# Patient Record
Sex: MALE | Race: WHITE
[De-identification: names, ages, dates, MRNs, and addresses within clinical notes are randomized per-mention and may not be internally consistent; named-entity substitution may affect disease eponyms.]

---

## 2019-11-17 ENCOUNTER — HOSPITAL ENCOUNTER (EMERGENCY)
Dept: HOSPITAL 95 - ER | Age: 49
Discharge: HOME | End: 2019-11-17
Payer: COMMERCIAL

## 2019-11-17 VITALS — WEIGHT: 194.01 LBS | BODY MASS INDEX: 27.16 KG/M2 | HEIGHT: 71 IN

## 2019-11-17 DIAGNOSIS — F17.210: ICD-10-CM

## 2019-11-17 DIAGNOSIS — K59.00: Primary | ICD-10-CM

## 2019-11-17 LAB
ALBUMIN SERPL BCP-MCNC: 3.5 G/DL (ref 3.4–5)
ALBUMIN/GLOB SERPL: 1.1 {RATIO} (ref 0.8–1.8)
ALT SERPL W P-5'-P-CCNC: 31 U/L (ref 12–78)
ANION GAP SERPL CALCULATED.4IONS-SCNC: 5 MMOL/L (ref 6–16)
AST SERPL W P-5'-P-CCNC: 30 U/L (ref 12–37)
BASOPHILS # BLD AUTO: 0.03 K/MM3 (ref 0–0.23)
BASOPHILS NFR BLD AUTO: 0 % (ref 0–2)
BILIRUB SERPL-MCNC: 0.4 MG/DL (ref 0.1–1)
BUN SERPL-MCNC: 12 MG/DL (ref 8–24)
CALCIUM SERPL-MCNC: 8.7 MG/DL (ref 8.5–10.1)
CHLORIDE SERPL-SCNC: 108 MMOL/L (ref 98–108)
CO2 SERPL-SCNC: 27 MMOL/L (ref 21–32)
CREAT SERPL-MCNC: 1.06 MG/DL (ref 0.6–1.2)
DEPRECATED RDW RBC AUTO: 45.2 FL (ref 35.1–46.3)
EOSINOPHIL # BLD AUTO: 0.25 K/MM3 (ref 0–0.68)
EOSINOPHIL NFR BLD AUTO: 3 % (ref 0–6)
ERYTHROCYTE [DISTWIDTH] IN BLOOD BY AUTOMATED COUNT: 12.5 % (ref 11.7–14.2)
GLOBULIN SER CALC-MCNC: 3.1 G/DL (ref 2.2–4)
GLUCOSE SERPL-MCNC: 126 MG/DL (ref 70–99)
HCT VFR BLD AUTO: 39.6 % (ref 37–53)
HGB BLD-MCNC: 13.3 G/DL (ref 13.5–17.5)
IMM GRANULOCYTES # BLD AUTO: 0.01 K/MM3 (ref 0–0.1)
IMM GRANULOCYTES NFR BLD AUTO: 0 % (ref 0–1)
KETONES UR STRIP-MCNC: (no result) MG/DL
LEUKOCYTE ESTERASE UR QL STRIP: (no result)
LYMPHOCYTES # BLD AUTO: 2.85 K/MM3 (ref 0.84–5.2)
LYMPHOCYTES NFR BLD AUTO: 37 % (ref 21–46)
MCHC RBC AUTO-ENTMCNC: 33.6 G/DL (ref 31.5–36.5)
MCV RBC AUTO: 98 FL (ref 80–100)
MONOCYTES # BLD AUTO: 0.51 K/MM3 (ref 0.16–1.47)
MONOCYTES NFR BLD AUTO: 7 % (ref 4–13)
NEUTROPHILS # BLD AUTO: 4.04 K/MM3 (ref 1.96–9.15)
NEUTROPHILS NFR BLD AUTO: 53 % (ref 41–73)
NRBC # BLD AUTO: 0 K/MM3 (ref 0–0.02)
NRBC BLD AUTO-RTO: 0 /100 WBC (ref 0–0.2)
PLATELET # BLD AUTO: 211 K/MM3 (ref 150–400)
POTASSIUM SERPL-SCNC: 3.9 MMOL/L (ref 3.5–5.5)
PROT SERPL-MCNC: 6.6 G/DL (ref 6.4–8.2)
RBC #/AREA URNS HPF: (no result) /HPF (ref 0–2)
SODIUM SERPL-SCNC: 140 MMOL/L (ref 136–145)
SP GR SPEC: 1.02 (ref 1–1.02)
UROBILINOGEN UR STRIP-MCNC: (no result) MG/DL
WBC #/AREA URNS HPF: (no result) /HPF (ref 0–5)

## 2020-07-14 ENCOUNTER — HOSPITAL ENCOUNTER (OUTPATIENT)
Dept: HOSPITAL 95 - ER | Age: 50
Setting detail: OBSERVATION
LOS: 2 days | Discharge: HOME | End: 2020-07-16
Attending: HOSPITALIST | Admitting: INTERNAL MEDICINE
Payer: COMMERCIAL

## 2020-07-14 VITALS — WEIGHT: 214.99 LBS | HEIGHT: 72.05 IN | BODY MASS INDEX: 29.12 KG/M2

## 2020-07-14 DIAGNOSIS — F17.210: ICD-10-CM

## 2020-07-14 DIAGNOSIS — Z71.6: ICD-10-CM

## 2020-07-14 DIAGNOSIS — Z66: ICD-10-CM

## 2020-07-14 DIAGNOSIS — J44.9: ICD-10-CM

## 2020-07-14 DIAGNOSIS — I10: ICD-10-CM

## 2020-07-14 DIAGNOSIS — R07.89: Primary | ICD-10-CM

## 2020-07-14 DIAGNOSIS — Z91.14: ICD-10-CM

## 2020-07-14 DIAGNOSIS — F15.10: ICD-10-CM

## 2020-07-14 DIAGNOSIS — F31.9: ICD-10-CM

## 2020-07-14 DIAGNOSIS — Z79.899: ICD-10-CM

## 2020-07-14 LAB
ALBUMIN SERPL BCP-MCNC: 3.8 G/DL (ref 3.4–5)
ALBUMIN/GLOB SERPL: 1.1 {RATIO} (ref 0.8–1.8)
ALT SERPL W P-5'-P-CCNC: 25 U/L (ref 12–78)
ANION GAP SERPL CALCULATED.4IONS-SCNC: 2 MMOL/L (ref 6–16)
AST SERPL W P-5'-P-CCNC: 20 U/L (ref 12–37)
BASOPHILS # BLD AUTO: 0.04 K/MM3 (ref 0–0.23)
BASOPHILS NFR BLD AUTO: 1 % (ref 0–2)
BILIRUB SERPL-MCNC: 0.5 MG/DL (ref 0.1–1)
BUN SERPL-MCNC: 16 MG/DL (ref 8–24)
CALCIUM SERPL-MCNC: 9.1 MG/DL (ref 8.5–10.1)
CHLORIDE SERPL-SCNC: 108 MMOL/L (ref 98–108)
CO2 SERPL-SCNC: 29 MMOL/L (ref 21–32)
CREAT SERPL-MCNC: 1.04 MG/DL (ref 0.6–1.2)
DEPRECATED RDW RBC AUTO: 46.5 FL (ref 35.1–46.3)
EOSINOPHIL # BLD AUTO: 0.45 K/MM3 (ref 0–0.68)
EOSINOPHIL NFR BLD AUTO: 6 % (ref 0–6)
ERYTHROCYTE [DISTWIDTH] IN BLOOD BY AUTOMATED COUNT: 13 % (ref 11.7–14.2)
GLOBULIN SER CALC-MCNC: 3.4 G/DL (ref 2.2–4)
GLUCOSE SERPL-MCNC: 110 MG/DL (ref 70–99)
HCT VFR BLD AUTO: 43.5 % (ref 37–53)
HGB BLD-MCNC: 14.6 G/DL (ref 13.5–17.5)
IMM GRANULOCYTES # BLD AUTO: 0.01 K/MM3 (ref 0–0.1)
IMM GRANULOCYTES NFR BLD AUTO: 0 % (ref 0–1)
LYMPHOCYTES # BLD AUTO: 3.03 K/MM3 (ref 0.84–5.2)
LYMPHOCYTES NFR BLD AUTO: 42 % (ref 21–46)
MCHC RBC AUTO-ENTMCNC: 33.6 G/DL (ref 31.5–36.5)
MCV RBC AUTO: 97 FL (ref 80–100)
MONOCYTES # BLD AUTO: 0.54 K/MM3 (ref 0.16–1.47)
MONOCYTES NFR BLD AUTO: 8 % (ref 4–13)
NEUTROPHILS # BLD AUTO: 3.16 K/MM3 (ref 1.96–9.15)
NEUTROPHILS NFR BLD AUTO: 44 % (ref 41–73)
NRBC # BLD AUTO: 0 K/MM3 (ref 0–0.02)
NRBC BLD AUTO-RTO: 0 /100 WBC (ref 0–0.2)
PLATELET # BLD AUTO: 229 K/MM3 (ref 150–400)
POTASSIUM SERPL-SCNC: 3.7 MMOL/L (ref 3.5–5.5)
PROT SERPL-MCNC: 7.2 G/DL (ref 6.4–8.2)
SODIUM SERPL-SCNC: 139 MMOL/L (ref 136–145)
TROPONIN I SERPL-MCNC: <0.015 NG/ML (ref 0–0.04)

## 2020-07-14 PROCEDURE — A9500 TC99M SESTAMIBI: HCPCS

## 2020-07-14 PROCEDURE — G0378 HOSPITAL OBSERVATION PER HR: HCPCS

## 2020-07-14 PROCEDURE — A9270 NON-COVERED ITEM OR SERVICE: HCPCS

## 2020-07-14 NOTE — NUR
UNABLE TO FIND OUT PATIENTS MEDICINES FROM [facility]. PER MD NOTE PATIENT ON HTN
MED, BUT DOES NOT KNOW IT.

## 2020-07-14 NOTE — NUR
TALKED TO MARY JANE ABOUT DIET. OK TO ORDER DINNER TRAY, BUT NPO AFTER MIDNITE.
ATTEMPT TO GET REPORT FROM DAREN MORENO

## 2020-07-15 LAB — CANNABINOIDS UR QL: DETECTED

## 2020-07-15 NOTE — NUR
ALERT. ORIENTED. COOPERATIVE. UNLABORED RESPIRATIONS. HAD FIRST PART OF STRESS
TEST. SCHEDULED FOR SECOND PART TOMORROW AT 12:30. NO ACUTE CHANGES. TELE ON
AND HAS BEEN SR. WCTM

## 2020-07-15 NOTE — NUR
SHIFT SUMMARY
 
RESTING QUIETLY AT INTERVALS THIS SHIFT. IVF INFUSED AND IV SITE SALINE
LOCKED. TOLERATED HS SNACK WELL AND HAS BEEN NPO SINCE 0000 FOR AM PROEDURE.
ICOLATION PRECAUTIONS MAINTAINED. CALL [facility] [place]

## 2020-07-15 NOTE — NUR
ADVISED; PATIENT LEFT BUILDING FOR ABOUT ONE HOUR AND HAD SMOKE AND
WHEN LEFT HAD C.PRESSURE THAT DID NOT RADIATE, NO DIAPHORESIS OR N/V. 6(1-10).
ORDER UDS AND NTG. PATIENT DID ADMIT TO [facility] 2 WEEKS AGO. PATIENT WAS ADVISED
CAN NOT LEAVE BUILDING, NEEDS MASK WHEN WALKING IN HALLS,& CAN NOT LEAVE THIS
FLOOR. ADVISED IF GONE LONGER THAN 1 HOUR WILL BE CONSIDERED HE LEFT AMA.
PATIENT VERBALIZES UNDERSTANDING.

## 2020-07-15 NOTE — NUR
PATIENT AWARE CAN EAT UP TILL 10AM, THEN H2O TILL 1430. NO CAFFEINE AFTER 6PM.
ORDERS PLACED ON BOARD AS SUCH. PATIENT VERBALIZES UNERSTANDING.

## 2020-07-16 NOTE — NUR
DISCHARGE NOTE-
 CAME TO SPEAK TO PT ABOUT HIS TEST RESULTS AND PT WAS DISCHARGED HOME.IV
AND TELE DC'D PRIOR TO PT DISCHARGE. PT WAS PROVIDED WITH VERBAL AND WRITTEN
DISCHARGE INSTRUCTIONS AND ACKNOWLEDGED UNDERSTANDING OF THEM. NO FURTHER
QUESTIONS AT THE TIME OF DISCHARGE. PT ESCORTED OUT BY THE CNA REFUSED WC
ESCORT.

## 2020-09-10 ENCOUNTER — HOSPITAL ENCOUNTER (OUTPATIENT)
Dept: HOSPITAL 95 - LAB EV | Age: 50
Discharge: HOME | End: 2020-09-10
Attending: GENERAL PRACTICE
Payer: COMMERCIAL

## 2020-09-10 ENCOUNTER — HOSPITAL ENCOUNTER (EMERGENCY)
Dept: HOSPITAL 95 - ER | Age: 50
Discharge: HOME | End: 2020-09-10
Payer: COMMERCIAL

## 2020-09-10 VITALS — BODY MASS INDEX: 30.8 KG/M2 | HEIGHT: 71 IN | WEIGHT: 220 LBS

## 2020-09-10 DIAGNOSIS — F31.9: ICD-10-CM

## 2020-09-10 DIAGNOSIS — E86.0: Primary | ICD-10-CM

## 2020-09-10 DIAGNOSIS — R19.7: ICD-10-CM

## 2020-09-10 DIAGNOSIS — R10.9: Primary | ICD-10-CM

## 2020-09-10 DIAGNOSIS — Z79.899: ICD-10-CM

## 2020-09-10 DIAGNOSIS — R19.5: ICD-10-CM

## 2020-09-10 DIAGNOSIS — I10: ICD-10-CM

## 2020-09-10 DIAGNOSIS — F17.210: ICD-10-CM

## 2020-09-10 DIAGNOSIS — R50.9: ICD-10-CM

## 2020-09-10 DIAGNOSIS — R10.31: ICD-10-CM

## 2020-09-10 LAB
ALBUMIN SERPL BCP-MCNC: 3.6 G/DL (ref 3.4–5)
ALBUMIN/GLOB SERPL: 1 {RATIO} (ref 0.8–1.8)
ALT SERPL W P-5'-P-CCNC: 23 U/L (ref 12–78)
AMYLASE SERPL-CCNC: 72 U/L (ref 25–115)
ANION GAP SERPL CALCULATED.4IONS-SCNC: 8 MMOL/L (ref 6–16)
AST SERPL W P-5'-P-CCNC: 18 U/L (ref 12–37)
BASOPHILS # BLD AUTO: 0.04 K/MM3 (ref 0–0.23)
BASOPHILS NFR BLD AUTO: 1 % (ref 0–2)
BILIRUB SERPL-MCNC: 0.2 MG/DL (ref 0.1–1)
BUN SERPL-MCNC: 21 MG/DL (ref 8–24)
CALCIUM SERPL-MCNC: 8.7 MG/DL (ref 8.5–10.1)
CHLORIDE SERPL-SCNC: 104 MMOL/L (ref 98–108)
CO2 SERPL-SCNC: 27 MMOL/L (ref 21–32)
CREAT SERPL-MCNC: 1.21 MG/DL (ref 0.6–1.2)
DEPRECATED RDW RBC AUTO: 45.1 FL (ref 35.1–46.3)
EOSINOPHIL # BLD AUTO: 0.51 K/MM3 (ref 0–0.68)
EOSINOPHIL NFR BLD AUTO: 6 % (ref 0–6)
ERYTHROCYTE [DISTWIDTH] IN BLOOD BY AUTOMATED COUNT: 12.7 % (ref 11.7–14.2)
GLOBULIN SER CALC-MCNC: 3.5 G/DL (ref 2.2–4)
GLUCOSE SERPL-MCNC: 96 MG/DL (ref 70–99)
HCT VFR BLD AUTO: 43.1 % (ref 37–53)
HGB BLD-MCNC: 14.7 G/DL (ref 13.5–17.5)
IMM GRANULOCYTES # BLD AUTO: 0.02 K/MM3 (ref 0–0.1)
IMM GRANULOCYTES NFR BLD AUTO: 0 % (ref 0–1)
LYMPHOCYTES # BLD AUTO: 2.87 K/MM3 (ref 0.84–5.2)
LYMPHOCYTES NFR BLD AUTO: 35 % (ref 21–46)
MCHC RBC AUTO-ENTMCNC: 34.1 G/DL (ref 31.5–36.5)
MCV RBC AUTO: 96 FL (ref 80–100)
MONOCYTES # BLD AUTO: 0.57 K/MM3 (ref 0.16–1.47)
MONOCYTES NFR BLD AUTO: 7 % (ref 4–13)
NEUTROPHILS # BLD AUTO: 4.1 K/MM3 (ref 1.96–9.15)
NEUTROPHILS NFR BLD AUTO: 51 % (ref 41–73)
NRBC # BLD AUTO: 0 K/MM3 (ref 0–0.02)
NRBC BLD AUTO-RTO: 0 /100 WBC (ref 0–0.2)
PLATELET # BLD AUTO: 236 K/MM3 (ref 150–400)
POTASSIUM SERPL-SCNC: 4.2 MMOL/L (ref 3.5–5.5)
PROT SERPL-MCNC: 7.1 G/DL (ref 6.4–8.2)
PROTHROMBIN TIME: 9.8 SEC (ref 9.7–11.5)
SODIUM SERPL-SCNC: 139 MMOL/L (ref 136–145)

## 2020-09-16 ENCOUNTER — HOSPITAL ENCOUNTER (EMERGENCY)
Dept: HOSPITAL 95 - ER | Age: 50
Discharge: HOME | End: 2020-09-16
Payer: COMMERCIAL

## 2020-09-16 VITALS — HEIGHT: 71 IN | WEIGHT: 214.99 LBS | BODY MASS INDEX: 30.1 KG/M2

## 2020-09-16 DIAGNOSIS — R10.9: Primary | ICD-10-CM

## 2020-09-16 DIAGNOSIS — F17.210: ICD-10-CM

## 2020-09-16 DIAGNOSIS — R07.9: ICD-10-CM

## 2020-09-16 DIAGNOSIS — Z79.899: ICD-10-CM

## 2020-09-16 DIAGNOSIS — R35.0: ICD-10-CM

## 2020-09-16 DIAGNOSIS — F31.9: ICD-10-CM

## 2020-09-16 DIAGNOSIS — I10: ICD-10-CM

## 2020-09-16 LAB
ALBUMIN SERPL BCP-MCNC: 3.5 G/DL (ref 3.4–5)
ALBUMIN/GLOB SERPL: 1.1 {RATIO} (ref 0.8–1.8)
ALT SERPL W P-5'-P-CCNC: 24 U/L (ref 12–78)
ANION GAP SERPL CALCULATED.4IONS-SCNC: 3 MMOL/L (ref 6–16)
AST SERPL W P-5'-P-CCNC: 19 U/L (ref 12–37)
BASOPHILS # BLD AUTO: 0.07 K/MM3 (ref 0–0.23)
BASOPHILS NFR BLD AUTO: 1 % (ref 0–2)
BILIRUB SERPL-MCNC: 0.3 MG/DL (ref 0.1–1)
BUN SERPL-MCNC: 17 MG/DL (ref 8–24)
CALCIUM SERPL-MCNC: 8.6 MG/DL (ref 8.5–10.1)
CHLORIDE SERPL-SCNC: 110 MMOL/L (ref 98–108)
CO2 SERPL-SCNC: 28 MMOL/L (ref 21–32)
CREAT SERPL-MCNC: 1.09 MG/DL (ref 0.6–1.2)
DEPRECATED RDW RBC AUTO: 43.2 FL (ref 35.1–46.3)
EOSINOPHIL # BLD AUTO: 0.46 K/MM3 (ref 0–0.68)
EOSINOPHIL NFR BLD AUTO: 4 % (ref 0–6)
ERYTHROCYTE [DISTWIDTH] IN BLOOD BY AUTOMATED COUNT: 12.4 % (ref 11.7–14.2)
GLOBULIN SER CALC-MCNC: 3.2 G/DL (ref 2.2–4)
GLUCOSE SERPL-MCNC: 114 MG/DL (ref 70–99)
HCT VFR BLD AUTO: 42.3 % (ref 37–53)
HGB BLD-MCNC: 14.7 G/DL (ref 13.5–17.5)
IMM GRANULOCYTES # BLD AUTO: 0.03 K/MM3 (ref 0–0.1)
IMM GRANULOCYTES NFR BLD AUTO: 0 % (ref 0–1)
LYMPHOCYTES # BLD AUTO: 2.79 K/MM3 (ref 0.84–5.2)
LYMPHOCYTES NFR BLD AUTO: 23 % (ref 21–46)
MCHC RBC AUTO-ENTMCNC: 34.8 G/DL (ref 31.5–36.5)
MCV RBC AUTO: 95 FL (ref 80–100)
MONOCYTES # BLD AUTO: 0.83 K/MM3 (ref 0.16–1.47)
MONOCYTES NFR BLD AUTO: 7 % (ref 4–13)
NEUTROPHILS # BLD AUTO: 7.94 K/MM3 (ref 1.96–9.15)
NEUTROPHILS NFR BLD AUTO: 66 % (ref 41–73)
NRBC # BLD AUTO: 0 K/MM3 (ref 0–0.02)
NRBC BLD AUTO-RTO: 0 /100 WBC (ref 0–0.2)
PLATELET # BLD AUTO: 249 K/MM3 (ref 150–400)
POTASSIUM SERPL-SCNC: 4.2 MMOL/L (ref 3.5–5.5)
PROT SERPL-MCNC: 6.7 G/DL (ref 6.4–8.2)
SODIUM SERPL-SCNC: 141 MMOL/L (ref 136–145)
SP GR SPEC: 1.02 (ref 1–1.02)
UROBILINOGEN UR STRIP-MCNC: (no result) MG/DL

## 2020-09-22 ENCOUNTER — HOSPITAL ENCOUNTER (OUTPATIENT)
Dept: HOSPITAL 95 - LAB EV | Age: 50
End: 2020-09-22
Attending: PHYSICIAN ASSISTANT
Payer: COMMERCIAL

## 2020-09-22 DIAGNOSIS — R10.31: Primary | ICD-10-CM

## 2020-09-22 LAB
BASOPHILS # BLD AUTO: 0.05 K/MM3 (ref 0–0.23)
BASOPHILS NFR BLD AUTO: 1 % (ref 0–2)
DEPRECATED RDW RBC AUTO: 44.2 FL (ref 35.1–46.3)
EOSINOPHIL # BLD AUTO: 0.42 K/MM3 (ref 0–0.68)
EOSINOPHIL NFR BLD AUTO: 7 % (ref 0–6)
ERYTHROCYTE [DISTWIDTH] IN BLOOD BY AUTOMATED COUNT: 12.8 % (ref 11.7–14.2)
HCT VFR BLD AUTO: 37.9 % (ref 37–53)
HGB BLD-MCNC: 13 G/DL (ref 13.5–17.5)
IMM GRANULOCYTES # BLD AUTO: 0.05 K/MM3 (ref 0–0.1)
IMM GRANULOCYTES NFR BLD AUTO: 1 % (ref 0–1)
LYMPHOCYTES # BLD AUTO: 2.39 K/MM3 (ref 0.84–5.2)
LYMPHOCYTES NFR BLD AUTO: 38 % (ref 21–46)
MCHC RBC AUTO-ENTMCNC: 34.3 G/DL (ref 31.5–36.5)
MCV RBC AUTO: 95 FL (ref 80–100)
MONOCYTES # BLD AUTO: 0.61 K/MM3 (ref 0.16–1.47)
MONOCYTES NFR BLD AUTO: 10 % (ref 4–13)
NEUTROPHILS # BLD AUTO: 2.72 K/MM3 (ref 1.96–9.15)
NEUTROPHILS NFR BLD AUTO: 44 % (ref 41–73)
NRBC # BLD AUTO: 0 K/MM3 (ref 0–0.02)
NRBC BLD AUTO-RTO: 0 /100 WBC (ref 0–0.2)
PLATELET # BLD AUTO: 226 K/MM3 (ref 150–400)

## 2020-10-14 ENCOUNTER — HOSPITAL ENCOUNTER (EMERGENCY)
Dept: HOSPITAL 95 - ER | Age: 50
Discharge: HOME | End: 2020-10-14
Payer: COMMERCIAL

## 2020-10-14 VITALS — BODY MASS INDEX: 30.1 KG/M2 | HEIGHT: 71 IN | WEIGHT: 214.99 LBS

## 2020-10-14 DIAGNOSIS — K21.9: Primary | ICD-10-CM

## 2020-10-14 DIAGNOSIS — R06.02: ICD-10-CM

## 2020-10-14 DIAGNOSIS — F31.9: ICD-10-CM

## 2020-10-14 DIAGNOSIS — R07.9: ICD-10-CM

## 2020-10-14 DIAGNOSIS — I10: ICD-10-CM

## 2020-10-14 DIAGNOSIS — F17.210: ICD-10-CM

## 2020-10-14 DIAGNOSIS — Z79.899: ICD-10-CM

## 2020-10-14 LAB
ALBUMIN SERPL BCP-MCNC: 3.9 G/DL (ref 3.4–5)
ALBUMIN/GLOB SERPL: 1.1 {RATIO} (ref 0.8–1.8)
ALT SERPL W P-5'-P-CCNC: 29 U/L (ref 12–78)
ANION GAP SERPL CALCULATED.4IONS-SCNC: 6 MMOL/L (ref 6–16)
AST SERPL W P-5'-P-CCNC: 29 U/L (ref 12–37)
BASOPHILS # BLD AUTO: 0.05 K/MM3 (ref 0–0.23)
BASOPHILS NFR BLD AUTO: 0 % (ref 0–2)
BILIRUB SERPL-MCNC: 0.2 MG/DL (ref 0.1–1)
BUN SERPL-MCNC: 17 MG/DL (ref 8–24)
CALCIUM SERPL-MCNC: 8.9 MG/DL (ref 8.5–10.1)
CHLORIDE SERPL-SCNC: 108 MMOL/L (ref 98–108)
CO2 SERPL-SCNC: 25 MMOL/L (ref 21–32)
CREAT SERPL-MCNC: 1.05 MG/DL (ref 0.6–1.2)
DEPRECATED RDW RBC AUTO: 44.6 FL (ref 35.1–46.3)
EOSINOPHIL # BLD AUTO: 0.12 K/MM3 (ref 0–0.68)
EOSINOPHIL NFR BLD AUTO: 1 % (ref 0–6)
ERYTHROCYTE [DISTWIDTH] IN BLOOD BY AUTOMATED COUNT: 12.3 % (ref 11.7–14.2)
GLOBULIN SER CALC-MCNC: 3.4 G/DL (ref 2.2–4)
GLUCOSE SERPL-MCNC: 107 MG/DL (ref 70–99)
HCT VFR BLD AUTO: 42.4 % (ref 37–53)
HGB BLD-MCNC: 14.2 G/DL (ref 13.5–17.5)
IMM GRANULOCYTES # BLD AUTO: 0.03 K/MM3 (ref 0–0.1)
IMM GRANULOCYTES NFR BLD AUTO: 0 % (ref 0–1)
LYMPHOCYTES # BLD AUTO: 1.49 K/MM3 (ref 0.84–5.2)
LYMPHOCYTES NFR BLD AUTO: 11 % (ref 21–46)
MCHC RBC AUTO-ENTMCNC: 33.5 G/DL (ref 31.5–36.5)
MCV RBC AUTO: 97 FL (ref 80–100)
MONOCYTES # BLD AUTO: 0.76 K/MM3 (ref 0.16–1.47)
MONOCYTES NFR BLD AUTO: 6 % (ref 4–13)
NEUTROPHILS # BLD AUTO: 10.81 K/MM3 (ref 1.96–9.15)
NEUTROPHILS NFR BLD AUTO: 82 % (ref 41–73)
NRBC # BLD AUTO: 0 K/MM3 (ref 0–0.02)
NRBC BLD AUTO-RTO: 0 /100 WBC (ref 0–0.2)
PLATELET # BLD AUTO: 228 K/MM3 (ref 150–400)
POTASSIUM SERPL-SCNC: 4.3 MMOL/L (ref 3.5–5.5)
PROT SERPL-MCNC: 7.3 G/DL (ref 6.4–8.2)
SODIUM SERPL-SCNC: 139 MMOL/L (ref 136–145)
TROPONIN I SERPL-MCNC: <0.015 NG/ML (ref 0–0.04)

## 2020-10-14 PROCEDURE — C9113 INJ PANTOPRAZOLE SODIUM, VIA: HCPCS

## 2021-06-12 ENCOUNTER — HOSPITAL ENCOUNTER (EMERGENCY)
Dept: HOSPITAL 95 - ER | Age: 51
Discharge: LEFT BEFORE DISCHARGE | End: 2021-06-12
Payer: COMMERCIAL

## 2021-06-12 VITALS — WEIGHT: 214.99 LBS | HEIGHT: 71 IN | BODY MASS INDEX: 30.1 KG/M2

## 2021-06-12 DIAGNOSIS — Z53.21: Primary | ICD-10-CM

## 2022-01-19 ENCOUNTER — HOSPITAL ENCOUNTER (EMERGENCY)
Dept: HOSPITAL 95 - ER | Age: 52
Discharge: HOME | End: 2022-01-19
Payer: COMMERCIAL

## 2022-01-19 VITALS — WEIGHT: 237.99 LBS | HEIGHT: 71 IN | BODY MASS INDEX: 33.32 KG/M2

## 2022-01-19 DIAGNOSIS — A08.4: Primary | ICD-10-CM

## 2022-01-19 DIAGNOSIS — I10: ICD-10-CM

## 2022-01-19 DIAGNOSIS — F17.210: ICD-10-CM

## 2022-01-19 LAB
ALBUMIN SERPL BCP-MCNC: 3.5 G/DL (ref 3.4–5)
ALBUMIN/GLOB SERPL: 1 {RATIO} (ref 0.8–1.8)
ALT SERPL W P-5'-P-CCNC: 26 U/L (ref 12–78)
ANION GAP SERPL CALCULATED.4IONS-SCNC: 6 MMOL/L (ref 6–16)
AST SERPL W P-5'-P-CCNC: 22 U/L (ref 12–37)
BASOPHILS # BLD AUTO: 0.01 K/MM3 (ref 0–0.23)
BASOPHILS NFR BLD AUTO: 0 % (ref 0–2)
BILIRUB SERPL-MCNC: 0.4 MG/DL (ref 0.1–1)
BUN SERPL-MCNC: 12 MG/DL (ref 8–24)
CALCIUM SERPL-MCNC: 8.4 MG/DL (ref 8.5–10.1)
CHLORIDE SERPL-SCNC: 109 MMOL/L (ref 98–108)
CO2 SERPL-SCNC: 25 MMOL/L (ref 21–32)
CREAT SERPL-MCNC: 1.02 MG/DL (ref 0.6–1.2)
DEPRECATED RDW RBC AUTO: 43 FL (ref 35.1–46.3)
EOSINOPHIL # BLD AUTO: 0.11 K/MM3 (ref 0–0.68)
EOSINOPHIL NFR BLD AUTO: 2 % (ref 0–6)
ERYTHROCYTE [DISTWIDTH] IN BLOOD BY AUTOMATED COUNT: 12.7 % (ref 11.7–14.2)
GLOBULIN SER CALC-MCNC: 3.4 G/DL (ref 2.2–4)
GLUCOSE SERPL-MCNC: 123 MG/DL (ref 70–99)
HCT VFR BLD AUTO: 46 % (ref 37–53)
HGB BLD-MCNC: 15.7 G/DL (ref 13.5–17.5)
IMM GRANULOCYTES # BLD AUTO: 0.01 K/MM3 (ref 0–0.1)
IMM GRANULOCYTES NFR BLD AUTO: 0 % (ref 0–1)
LYMPHOCYTES # BLD AUTO: 1.91 K/MM3 (ref 0.84–5.2)
LYMPHOCYTES NFR BLD AUTO: 38 % (ref 21–46)
MCHC RBC AUTO-ENTMCNC: 34.1 G/DL (ref 31.5–36.5)
MCV RBC AUTO: 92 FL (ref 80–100)
MONOCYTES # BLD AUTO: 0.26 K/MM3 (ref 0.16–1.47)
MONOCYTES NFR BLD AUTO: 5 % (ref 4–13)
NEUTROPHILS # BLD AUTO: 2.7 K/MM3 (ref 1.96–9.15)
NEUTROPHILS NFR BLD AUTO: 54 % (ref 41–73)
NRBC # BLD AUTO: 0 K/MM3 (ref 0–0.02)
NRBC BLD AUTO-RTO: 0 /100 WBC (ref 0–0.2)
PLATELET # BLD AUTO: 212 K/MM3 (ref 150–400)
POTASSIUM SERPL-SCNC: 4 MMOL/L (ref 3.5–5.5)
PROT SERPL-MCNC: 6.9 G/DL (ref 6.4–8.2)
SODIUM SERPL-SCNC: 140 MMOL/L (ref 136–145)

## 2022-01-19 PROCEDURE — A9270 NON-COVERED ITEM OR SERVICE: HCPCS

## 2022-10-09 ENCOUNTER — HOSPITAL ENCOUNTER (EMERGENCY)
Dept: HOSPITAL 95 - ER | Age: 52
Discharge: HOME | End: 2022-10-09
Payer: COMMERCIAL

## 2022-10-09 VITALS — WEIGHT: 240 LBS | BODY MASS INDEX: 35.55 KG/M2 | HEIGHT: 69 IN

## 2022-10-09 DIAGNOSIS — Z79.899: ICD-10-CM

## 2022-10-09 DIAGNOSIS — I10: ICD-10-CM

## 2022-10-09 DIAGNOSIS — F17.210: ICD-10-CM

## 2022-10-09 DIAGNOSIS — N23: Primary | ICD-10-CM

## 2022-10-09 LAB
ALBUMIN SERPL BCP-MCNC: 3.5 G/DL (ref 3.4–5)
ALBUMIN/GLOB SERPL: 1.1 {RATIO} (ref 0.8–1.8)
ALT SERPL W P-5'-P-CCNC: 23 U/L (ref 12–78)
ANION GAP SERPL CALCULATED.4IONS-SCNC: 7 MMOL/L (ref 6–16)
AST SERPL W P-5'-P-CCNC: 20 U/L (ref 12–37)
BASOPHILS # BLD AUTO: 0.01 K/MM3 (ref 0–0.23)
BASOPHILS NFR BLD AUTO: 0 % (ref 0–2)
BILIRUB SERPL-MCNC: 0.3 MG/DL (ref 0.1–1)
BUN SERPL-MCNC: 20 MG/DL (ref 8–24)
CALCIUM SERPL-MCNC: 8 MG/DL (ref 8.5–10.1)
CHLORIDE SERPL-SCNC: 108 MMOL/L (ref 98–108)
CO2 SERPL-SCNC: 26 MMOL/L (ref 21–32)
CREAT SERPL-MCNC: 1.07 MG/DL (ref 0.6–1.2)
DEPRECATED RDW RBC AUTO: 44.6 FL (ref 35.1–46.3)
EOSINOPHIL # BLD AUTO: 0.09 K/MM3 (ref 0–0.68)
EOSINOPHIL NFR BLD AUTO: 1 % (ref 0–6)
ERYTHROCYTE [DISTWIDTH] IN BLOOD BY AUTOMATED COUNT: 13 % (ref 11.7–14.2)
GLOBULIN SER CALC-MCNC: 3.3 G/DL (ref 2.2–4)
GLUCOSE SERPL-MCNC: 140 MG/DL (ref 70–99)
GLUCOSE UR-MCNC: (no result) MG/DL
HCT VFR BLD AUTO: 44.6 % (ref 37–53)
HGB BLD-MCNC: 15.2 G/DL (ref 13.5–17.5)
IMM GRANULOCYTES # BLD AUTO: 0.05 K/MM3 (ref 0–0.1)
IMM GRANULOCYTES NFR BLD AUTO: 0 % (ref 0–1)
LYMPHOCYTES # BLD AUTO: 1.4 K/MM3 (ref 0.84–5.2)
LYMPHOCYTES NFR BLD AUTO: 12 % (ref 21–46)
MCHC RBC AUTO-ENTMCNC: 34.1 G/DL (ref 31.5–36.5)
MCV RBC AUTO: 94 FL (ref 80–100)
MONOCYTES # BLD AUTO: 0.47 K/MM3 (ref 0.16–1.47)
MONOCYTES NFR BLD AUTO: 4 % (ref 4–13)
NEUTROPHILS # BLD AUTO: 9.38 K/MM3 (ref 1.96–9.15)
NEUTROPHILS NFR BLD AUTO: 82 % (ref 41–73)
NRBC # BLD AUTO: 0 K/MM3 (ref 0–0.02)
NRBC BLD AUTO-RTO: 0 /100 WBC (ref 0–0.2)
PLATELET # BLD AUTO: 251 K/MM3 (ref 150–400)
POTASSIUM SERPL-SCNC: 4.1 MMOL/L (ref 3.5–5.5)
PROT SERPL-MCNC: 6.8 G/DL (ref 6.4–8.2)
RBC #/AREA URNS HPF: (no result) /HPF (ref 0–2)
SODIUM SERPL-SCNC: 141 MMOL/L (ref 136–145)
SP GR SPEC: 1.02 (ref 1–1.02)
UROBILINOGEN UR STRIP-MCNC: (no result) MG/DL
WBC #/AREA URNS HPF: (no result) /HPF (ref 0–5)